# Patient Record
Sex: FEMALE | Race: BLACK OR AFRICAN AMERICAN | NOT HISPANIC OR LATINO | Employment: OTHER | URBAN - METROPOLITAN AREA
[De-identification: names, ages, dates, MRNs, and addresses within clinical notes are randomized per-mention and may not be internally consistent; named-entity substitution may affect disease eponyms.]

---

## 2017-06-27 ENCOUNTER — TRANSCRIBE ORDERS (OUTPATIENT)
Dept: PHYSICAL THERAPY | Facility: HOSPITAL | Age: 52
End: 2017-06-27

## 2017-06-27 DIAGNOSIS — M48.00 SPINAL STENOSIS, UNSPECIFIED SPINAL REGION: Primary | ICD-10-CM

## 2017-07-18 ENCOUNTER — HOSPITAL ENCOUNTER (OUTPATIENT)
Dept: PHYSICAL THERAPY | Facility: HOSPITAL | Age: 52
Setting detail: THERAPIES SERIES
Discharge: HOME | End: 2017-07-18

## 2017-07-18 DIAGNOSIS — M54.42 CHRONIC BILATERAL LOW BACK PAIN WITH BILATERAL SCIATICA: Primary | ICD-10-CM

## 2017-07-18 DIAGNOSIS — G89.29 CHRONIC BILATERAL LOW BACK PAIN WITH BILATERAL SCIATICA: Primary | ICD-10-CM

## 2017-07-18 DIAGNOSIS — M54.41 CHRONIC BILATERAL LOW BACK PAIN WITH BILATERAL SCIATICA: Primary | ICD-10-CM

## 2017-07-18 PROCEDURE — G8979 MOBILITY GOAL STATUS: HCPCS

## 2017-07-18 PROCEDURE — G8978 MOBILITY CURRENT STATUS: HCPCS

## 2017-07-18 PROCEDURE — 97110 THERAPEUTIC EXERCISES: CPT | Performed by: PHYSICAL THERAPIST

## 2017-07-18 PROCEDURE — 97161 PT EVAL LOW COMPLEX 20 MIN: CPT | Performed by: PHYSICAL THERAPIST
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
## 2017-07-19 ENCOUNTER — HOSPITAL ENCOUNTER (OUTPATIENT)
Dept: PHYSICAL THERAPY | Facility: HOSPITAL | Age: 52
Setting detail: THERAPIES SERIES
Discharge: HOME | End: 2017-07-19

## 2017-07-19 DIAGNOSIS — G89.29 CHRONIC BILATERAL LOW BACK PAIN WITH BILATERAL SCIATICA: Primary | ICD-10-CM

## 2017-07-19 DIAGNOSIS — M54.42 CHRONIC BILATERAL LOW BACK PAIN WITH BILATERAL SCIATICA: Primary | ICD-10-CM

## 2017-07-19 DIAGNOSIS — M54.41 CHRONIC BILATERAL LOW BACK PAIN WITH BILATERAL SCIATICA: Primary | ICD-10-CM

## 2017-07-19 PROCEDURE — 97113 AQUATIC THERAPY/EXERCISES: CPT | Performed by: PHYSICAL THERAPIST

## 2017-07-19 NOTE — THERAPY TREATMENT NOTE
Outpatient Physical Therapy Ortho Treatment Note  [facility]     Patient Name: Jessenia López  : 1965  MRN: [id number]  Today's Date: 2017      Visit Date: 2017    Visit Dx:    ICD-10-CM ICD-9-CM   1. Chronic bilateral low back pain with bilateral sciatica M54.42 724.2    M54.41 724.3    G89.29 338.29       There is no problem list on file for this patient.       Past Medical History:   Diagnosis Date   • Arthritis    • Diabetes mellitus    • Hypertension    • Osteoporosis         Past Surgical History:   Procedure Laterality Date   • BRAIN SURGERY      brain tumor removed               PT Assessment/Plan       17 1531       PT Assessment    Assessment Comments Initiated aqua therapy today and provided demonstration for all exercises performed as well as postural cues. Patient unable to maintain stability during bicycle kicks without min A from the therapist and reported a slight increase in right hip pain during the hip circles and hip abduction exercise. Educated patient on engaging abdominals to stabilize spine and performing smaller/more controlled kicks.   -     PT Plan    PT Plan Comments Assess response to first session and progress with core stabilization, LE strengthening, and LE flexibility.   -ROCKY       User Key  (r) = Recorded By, (t) = Taken By, (c) = Cosigned By    Initials Name Provider Type    ROCKY Rangel, PT Physical Therapist                    Exercises       17 1300          Subjective Comments    Subjective Comments I've been having a new pain in my right hip for the past week and a half. I'm thinking I need to go talk to my doctor about it  -ROCKY      Subjective Pain    Able to rate subjective pain? yes  -ROCKY      Pre-Treatment Pain Level 6  -KH      Post-Treatment Pain Level 6  -KH      Aquatics    Aquatics performed? Yes  -ROCKY      Aquatics LE    Water Walk forward;side;backward   x3 each  -ROCKY      Stretch 2 HS with noodle and sweeps, small noodle  -ROCKY       Stretch 3 piriformis 2x 30  -KH      Stretch Other 1 hip circles 10/10   -KH      Stretch Other 2 DKTC holding rail for decompression purposes 30 seconds prn  -KH      Abdominals noodle   large x 15  -KH      Clams --  -KH      Hip Abd/Add 15  -KH      Mini Squat 15  -KH      Toe/Heel Raises water jogging 2 minutes  -KH      Uni-Squat jumping jacks 1 minute  -KH      Bicycle 2 minutes with LN and min A  -KH        User Key  (r) = Recorded By, (t) = Taken By, (c) = Cosigned By    Initials Name Provider Type    ROCKY Rangel, PT Physical Therapist        Time Calculation:   Start Time: 1345  Stop Time: 1428  Time Calculation (min): 43 min    Therapy Charges for Today     Code Description Service Date Service Provider Modifiers Qty    96898086384 HC PT AQUATIC THERAPY EA 15 MIN 7/19/2017 Donna Rangel, PT GP 3                    Donna Rangel, PT  7/19/2017

## 2017-07-31 ENCOUNTER — HOSPITAL ENCOUNTER (OUTPATIENT)
Dept: PHYSICAL THERAPY | Facility: HOSPITAL | Age: 52
Setting detail: THERAPIES SERIES
End: 2017-07-31

## 2017-08-02 ENCOUNTER — APPOINTMENT (OUTPATIENT)
Dept: PHYSICAL THERAPY | Facility: HOSPITAL | Age: 52
End: 2017-08-02

## 2017-08-07 ENCOUNTER — APPOINTMENT (OUTPATIENT)
Dept: PHYSICAL THERAPY | Facility: HOSPITAL | Age: 52
End: 2017-08-07

## 2017-08-09 ENCOUNTER — APPOINTMENT (OUTPATIENT)
Dept: PHYSICAL THERAPY | Facility: HOSPITAL | Age: 52
End: 2017-08-09

## 2017-08-14 ENCOUNTER — HOSPITAL ENCOUNTER (OUTPATIENT)
Dept: PHYSICAL THERAPY | Facility: HOSPITAL | Age: 52
Setting detail: THERAPIES SERIES
Discharge: HOME | End: 2017-08-14

## 2017-08-14 DIAGNOSIS — M54.42 CHRONIC BILATERAL LOW BACK PAIN WITH BILATERAL SCIATICA: Primary | ICD-10-CM

## 2017-08-14 DIAGNOSIS — G89.29 CHRONIC BILATERAL LOW BACK PAIN WITH BILATERAL SCIATICA: Primary | ICD-10-CM

## 2017-08-14 DIAGNOSIS — M54.41 CHRONIC BILATERAL LOW BACK PAIN WITH BILATERAL SCIATICA: Primary | ICD-10-CM

## 2017-08-14 PROCEDURE — 97113 AQUATIC THERAPY/EXERCISES: CPT | Performed by: PHYSICAL THERAPIST

## 2017-08-14 NOTE — THERAPY TREATMENT NOTE
6   Outpatient Physical Therapy Ortho Treatment Note  [facility]     Patient Name: Jessenia López  : 1965  MRN: [id number]  Today's Date: 2017      Visit Date: 2017    Visit Dx:    ICD-10-CM ICD-9-CM   1. Chronic bilateral low back pain with bilateral sciatica M54.42 724.2    M54.41 724.3    G89.29 338.29       There is no problem list on file for this patient.       Past Medical History:   Diagnosis Date   • Arthritis    • Diabetes mellitus    • Hypertension    • Osteoporosis         Past Surgical History:   Procedure Laterality Date   • BRAIN SURGERY      brain tumor removed                             PT Assessment/Plan       17 1203       PT Assessment    Assessment Comments Patient reports high pain level today, therefore began session seated and performed bicycle kicks seated instead of using noodle. Also added seated lumbar stretch to try and alleviate pain and ended session in hot tub.   -     PT Plan    PT Plan Comments Progress with core stability, LE strengthening, and LE flexibility as tolerated.   -ROCKY       User Key  (r) = Recorded By, (t) = Taken By, (c) = Cosigned By    Initials Name Provider Type    ROCKY Rangel, PT Physical Therapist                    Exercises       17 1200          Subjective Comments    Subjective Comments Ever since I woke up my back pain has been hurting. It's really bad today.   -ROCKY      Subjective Pain    Able to rate subjective pain? yes  -      Pre-Treatment Pain Level 9  -KH      Post-Treatment Pain Level 9  -KH      Aquatics    Aquatics performed? Yes  -      Aquatics LE    Water Walk forward;side;backward   x3 each  -KH      Stretch 1 sit to stand x10 no hands  -KH      Stretch 2 HS with noodle and sweeps, small noodle  -KH      Stretch 3 piriformis 2x 30  -KH      Stretch Other 1 hip circles 15/15  -KH      Stretch Other 2 DKTC holding rail for decompression purposes 30 seconds prn  -KH      Abdominals noodle   large x 15   -KH      Hip Abd/Add 15  -KH      Toe/Heel Raises water jogging 2 minutes  -KH      Uni-Squat jumping jacks 1 minute  -KH      Bicycle 2 minutes on bench  -KH      Flutter/Scissor 2 min each  -      Aquatics UE    Stretch 1 lumbar forward trunk flexion at bench with LN and then trunk rotation ~1 min   -        User Key  (r) = Recorded By, (t) = Taken By, (c) = Cosigned By    Initials Name Provider Type    ROCKY Rangel, PT Physical Therapist                               PT OP Goals       08/14/17 1200       PT Short Term Goals    STG Date to Achieve 03/18/16  -     STG 1 Patient will report a reduction in pain for 24 hours or greater following aquatic therapy session  -     STG 1 Progress Met  -     STG 1 Progress Comments pain noticeably better for about a week after 1st session.  -     STG 2 Patient will improve 5xSTS from 22.2 seconds to <18 seconds in order to decrease risk of falls  -     STG 2 Progress Ongoing  -     STG 3 Patient will demonstrate safety and independence with initial HEP  -     STG 3 Progress Met  -     Long Term Goals    LTG Date to Achieve 04/08/16  -     LTG 1 Patient will report increased standing tolerance from 5 minutes to 10 min or greater to allow patient to complete ADLs without an increase in pain  -     LTG 1 Progress Ongoing  -     LTG 1 Progress Comments was better but is worse today  -     LTG 2 Patient will improve perceived level of disability as measured by the Oswestry from 54% disability to </=40% disability in order to improve quality of life.   -     LTG 2 Progress Ongoing  -     LTG 3 Patient will be educated on and demonstrate safety and independence with advanced aquatic exercises for core stabilization  -     LTG 3 Progress Ongoing  -     LTG 4 Pt will be independent with advanced aquatic program for decreased pain and improved mobility with ADLs  -     LTG 4 Progress Ongoing  -       User Key  (r) = Recorded By, (t) = Taken  By, (c) = Cosigned By    Initials Name Provider Type     Donna Rangel, PT Physical Therapist                    Time Calculation:   Start Time: 1159  Stop Time: 1242  Time Calculation (min): 43 min    Therapy Charges for Today     Code Description Service Date Service Provider Modifiers Qty    46616181030  PT AQUATIC THERAPY EA 15 MIN 8/14/2017 Donna Rangel, PT GP 3                    Donna Rangel, PT  8/14/2017

## 2017-08-16 ENCOUNTER — HOSPITAL ENCOUNTER (OUTPATIENT)
Dept: PHYSICAL THERAPY | Facility: HOSPITAL | Age: 52
Setting detail: THERAPIES SERIES
Discharge: HOME | End: 2017-08-16

## 2017-08-16 DIAGNOSIS — G89.29 CHRONIC BILATERAL LOW BACK PAIN WITH BILATERAL SCIATICA: Primary | ICD-10-CM

## 2017-08-16 DIAGNOSIS — M54.42 CHRONIC BILATERAL LOW BACK PAIN WITH BILATERAL SCIATICA: Primary | ICD-10-CM

## 2017-08-16 DIAGNOSIS — M54.41 CHRONIC BILATERAL LOW BACK PAIN WITH BILATERAL SCIATICA: Primary | ICD-10-CM

## 2017-08-16 PROCEDURE — G8978 MOBILITY CURRENT STATUS: HCPCS | Performed by: PHYSICAL THERAPIST

## 2017-08-16 PROCEDURE — G8979 MOBILITY GOAL STATUS: HCPCS | Performed by: PHYSICAL THERAPIST

## 2017-08-16 PROCEDURE — 97113 AQUATIC THERAPY/EXERCISES: CPT | Performed by: PHYSICAL THERAPIST

## 2017-08-16 NOTE — THERAPY PROGRESS REPORT/RE-CERT
Outpatient Physical Therapy Ortho Treatment Note  [facility]     Patient Name: Jessenia López  : 1965  MRN: [id number]  Today's Date: 2017      Visit Date: 2017    Visit Dx:    ICD-10-CM ICD-9-CM   1. Chronic bilateral low back pain with bilateral sciatica M54.42 724.2    M54.41 724.3    G89.29 338.29       There is no problem list on file for this patient.       Past Medical History:   Diagnosis Date   • Arthritis    • Diabetes mellitus    • Hypertension    • Osteoporosis         Past Surgical History:   Procedure Laterality Date   • BRAIN SURGERY      brain tumor removed             PT Ortho       17 1100    Myotomal Screen- Lower Quarter Clearing    Hip flexion (L2) Bilateral:;4- (Good -)  -KH    Knee extension (L3) Left:;4- (Good -);Right:;4 (Good)  -KH    Ankle DF (L4) Left:;4- (Good -);Right:;4 (Good)  -KH    Ankle PF (S1) Left:;4- (Good -);Right:;4 (Good)  -KH    Knee flexion (S2) Bilateral:;4 (Good)  -KH      User Key  (r) = Recorded By, (t) = Taken By, (c) = Cosigned By    Initials Name Provider Type    ROCKY Rangel PT Physical Therapist                            PT Assessment/Plan       17 1155       PT Assessment    Functional Limitations Performance in leisure activities;Limitation in home management;Limitations in community activities;Impaired gait;Limitations in functional capacity and performance  -     Impairments Pain;Muscle strength;Posture;Range of motion;Balance;Endurance;Gait;Joint integrity;Poor body mechanics;Joint mobility  -     Assessment Comments Patient has now received 3 sessions of skilled PT for back pain due to spinal stenosis. She reports minimal improvement in her standing tolerance however demonstrates a slight improvement in her left knee flexion strength since eval. She also improved her 5xSTS from 22.4 seconds at eval to 13.4 seconds today demonstrating increased functional strength. No significant/consistent changes in pain but  only limited number of sessions completed. Recommend continued skilled PT to further increase functional strength and core stability.    -     Please refer to paper survey for additional self-reported information Yes  -KH     Rehab Potential Good  -KH     Patient/caregiver participated in establishment of treatment plan and goals Yes  -KH     Patient would benefit from skilled therapy intervention Yes  -KH     PT Plan    PT Frequency 2x/week  -KH     Predicted Duration of Therapy Intervention (days/wks) 4 weeks   -KH     Planned CPT's? PT AQUATIC THERAPY EA 15 MIN: 53707;PT RE-EVAL: 03160;PT THER PROC EA 15 MIN: 91636  -KH     Physical Therapy Interventions (Optional Details) aquatics exercise;home exercise program;stretching;transfer training;postural re-education;ROM (Range of Motion);stair training;strengthening;gait training;gross motor skills;patient/family education;lumbar stabilization  -     PT Plan Comments Progress with core stability, LE strengthening, and LE flexibility as tolerated.   -       User Key  (r) = Recorded By, (t) = Taken By, (c) = Cosigned By    Initials Name Provider Type    ROCKY Rangel, PT Physical Therapist                    Exercises       08/16/17 1100          Subjective Comments    Subjective Comments My pain is okay today but I took an Ibprofen before I came   -      Subjective Pain    Able to rate subjective pain? yes  -KH      Pre-Treatment Pain Level 5  -KH      Post-Treatment Pain Level 5  -KH      Aquatics    Aquatics performed? Yes  -KH      Aquatics LE    Water Walk forward;side;backward   x3 each  -KH      Stretch 1 sit to stand x10 no hands  -KH      Stretch 2 HS with noodle and sweeps, small noodle  -KH      Stretch 3 piriformis 2x 30  -KH      Stretch Other 1 hip circles 15/15  -KH      Stretch Other 2 DKTC holding rail for decompression purposes 30 seconds prn  -KH      Abdominals noodle   large x15  -KH      Hip Abd/Add 15x  -KH      Hip Flex/Ext 15x ext  -KH  "     March in Place 30x  -KH      Mini Squat 15  -KH      Toe/Heel Raises water jogging 2 minutes  -KH      Uni-Squat jumping jacks 1 minute  -KH      Uni-Clock leg press blue ring x 20  -KH      Step Ups 8\" x 10  -KH      Bicycle 2 minutes on bench and 2 min with LN  -KH      Flutter/Scissor 2 min each  -        User Key  (r) = Recorded By, (t) = Taken By, (c) = Cosigned By    Initials Name Provider Type    ROCKY Rangel PT Physical Therapist                               PT OP Goals       08/16/17 1200       PT Short Term Goals    STG Date to Achieve 03/18/16  -     STG 1 Patient will report a reduction in pain for 24 hours or greater following aquatic therapy session  -     STG 1 Progress Met  -     STG 2 Patient will improve 5xSTS from 22.2 seconds to <18 seconds in order to decrease risk of falls  -     STG 2 Progress Met  -     STG 2 Progress Comments 13.4 sec  -     STG 3 Patient will demonstrate safety and independence with initial HEP  -     STG 3 Progress Met  -     Long Term Goals    LTG Date to Achieve 04/08/16  -     LTG 1 Patient will report increased standing tolerance from 5 minutes to 10 min or greater to allow patient to complete ADLs without an increase in pain  -     LTG 1 Progress Ongoing  -     LTG 1 Progress Comments still the same   -     LTG 2 Patient will improve perceived level of disability as measured by the Oswestry from 54% disability to </=40% disability in order to improve quality of life.   -     LTG 2 Progress Ongoing  -     LTG 2 Progress Comments 66% disability  -     LTG 3 Patient will be educated on and demonstrate safety and independence with advanced aquatic exercises for core stabilization  -     LTG 3 Progress Ongoing  -       User Key  (r) = Recorded By, (t) = Taken By, (c) = Cosigned By    Initials Name Provider Type    ROCKY Rangel PT Physical Therapist                    Outcome Measures       08/16/17 1100          5 Times Sit " to Stand    5 Times Sit to Stand (seconds) 13.4 seconds  -      5 Times Sit to Stand Comments no hands  -      Modified Oswestry    Modified Oswestry Score/Comments 66% disability  -      Functional Assessment    Outcome Measure Options 5x Sit to Stand;Modifed Owestry  -KH        User Key  (r) = Recorded By, (t) = Taken By, (c) = Cosigned By    Initials Name Provider Type    ROCKY Rangel PT Physical Therapist            Time Calculation:   Start Time: 1158  Stop Time: 1241  Time Calculation (min): 43 min    Therapy Charges for Today     Code Description Service Date Service Provider Modifiers Qty    62732759757 HC PT AQUATIC THERAPY EA 15 MIN 8/16/2017 Donna Rangel, PT GP 3    19903940858 HC PT MOBILITY CURRENT 8/16/2017 Donna Rangel, PT GP, CL 1    [id number] HC PT MOBILITY PROJECTED 8/16/2017 Donna Rangel, PT GP, CK 1          PT G-Codes  PT Professional Judgement Used?: Yes  Outcome Measure Options: Moddemarcusarjun Huertalexus  Score: 66%  Functional Limitation: Mobility: Walking and moving around  Mobility: Walking and Moving Around Current Status (): At least 60 percent but less than 80 percent impaired, limited or restricted  Mobility: Walking and Moving Around Goal Status (): At least 40 percent but less than 60 percent impaired, limited or restricted         Donna Rangel, BLANCA  8/16/2017

## 2017-08-21 ENCOUNTER — HOSPITAL ENCOUNTER (OUTPATIENT)
Dept: PHYSICAL THERAPY | Facility: HOSPITAL | Age: 52
Setting detail: THERAPIES SERIES
Discharge: HOME | End: 2017-08-21

## 2017-08-21 DIAGNOSIS — M54.42 CHRONIC BILATERAL LOW BACK PAIN WITH BILATERAL SCIATICA: Primary | ICD-10-CM

## 2017-08-21 DIAGNOSIS — G89.29 CHRONIC BILATERAL LOW BACK PAIN WITH BILATERAL SCIATICA: Primary | ICD-10-CM

## 2017-08-21 DIAGNOSIS — M54.41 CHRONIC BILATERAL LOW BACK PAIN WITH BILATERAL SCIATICA: Primary | ICD-10-CM

## 2017-08-21 PROCEDURE — 97113 AQUATIC THERAPY/EXERCISES: CPT | Performed by: PHYSICAL THERAPIST

## 2017-08-21 NOTE — THERAPY TREATMENT NOTE
Outpatient Physical Therapy Ortho Treatment Note  [facility]     Patient Name: Jessenia López  : 1965  MRN: [id number]  Today's Date: 2017      Visit Date: 2017    Visit Dx:    ICD-10-CM ICD-9-CM   1. Chronic bilateral low back pain with bilateral sciatica M54.42 724.2    M54.41 724.3    G89.29 338.29       There is no problem list on file for this patient.       Past Medical History:   Diagnosis Date   • Arthritis    • Diabetes mellitus    • Hypertension    • Osteoporosis         Past Surgical History:   Procedure Laterality Date   • BRAIN SURGERY      brain tumor removed                             PT Assessment/Plan       17 1229       PT Assessment    Assessment Comments Patient's pain level appears to be steadily improving and she was able to complete her water walks and stretches with minimal cueing today. Patient still requires min A to stabilize her balance when performing bicycle kicks with noodle as well as UE support on railing   -KH     PT Plan    PT Plan Comments Progress with core stability, LE strengthening, and LE flexibility as tolerated.   -ROCKY       User Key  (r) = Recorded By, (t) = Taken By, (c) = Cosigned By    Initials Name Provider Type    ROCKY Rangel, PT Physical Therapist                    Exercises       17 1200          Subjective Comments    Subjective Comments I feel a whole lot better than I did when I first started therapy  -KH      Subjective Pain    Able to rate subjective pain? yes  -KH      Pre-Treatment Pain Level 4  -KH      Post-Treatment Pain Level 4  -KH      Aquatics    Aquatics performed? Yes  -ROCKY      Aquatics LE    Water Walk forward;side;backward   x3  -KH      Stretch 1 sit to stand x10 no hands  -KH      Stretch 2 HS with noodle and sweeps, small noodle  -KH      Stretch 3 piriformis 2x 30  -KH      Stretch Other 1 hip circles 20/20  -KH      Stretch Other 2 DKTC holding rail for decompression purposes 30 seconds prn  -KH    "   Abdominals noodle   large x 15  -KH      Hip Abd/Add 15x  -KH      Hip Flex/Ext 15x ext  -KH      March in Place 30x  -KH      Mini Squat 15  -KH      Toe/Heel Raises water jogging 2 minutes  -KH      Uni-Squat jumping jacks 1 minute  -KH      Uni-Clock leg press blue ring x 20  -KH      Step Ups 8\" x 10  -KH      Bicycle 2 minutes on bench and 2 min with LN  -KH      Flutter/Scissor 2 min each  -        User Key  (r) = Recorded By, (t) = Taken By, (c) = Cosigned By    Initials Name Provider Type    ROCKY Rangel, PT Physical Therapist                               PT OP Goals       08/21/17 1200       PT Short Term Goals    STG Date to Achieve 03/18/16  -     STG 1 Patient will report a reduction in pain for 24 hours or greater following aquatic therapy session  -     STG 1 Progress Met  -     STG 2 Patient will improve 5xSTS from 22.2 seconds to <18 seconds in order to decrease risk of falls  -     STG 2 Progress Met  -     STG 3 Patient will demonstrate safety and independence with initial HEP  -     STG 3 Progress Met  -     Long Term Goals    LTG Date to Achieve 04/08/16  -     LTG 1 Patient will report increased standing tolerance from 5 minutes to 10 min or greater to allow patient to complete ADLs without an increase in pain  -     LTG 1 Progress Ongoing  -     LTG 1 Progress Comments no change   -     LTG 2 Patient will improve perceived level of disability as measured by the Oswestry from 54% disability to </=40% disability in order to improve quality of life.   -     LTG 2 Progress Ongoing  -     LTG 3 Patient will be educated on and demonstrate safety and independence with advanced aquatic exercises for core stabilization  -     LTG 3 Progress Ongoing  -     LTG 4 Pt will be independent with advanced aquatic program for decreased pain and improved mobility with ADLs  -     LTG 4 Progress Ongoing  -       User Key  (r) = Recorded By, (t) = Taken By, (c) = Cosigned " By    Initials Name Provider Type     Donna Rangel, PT Physical Therapist                    Time Calculation:   Start Time: 1200  Stop Time: 1242  Time Calculation (min): 42 min    Therapy Charges for Today     Code Description Service Date Service Provider Modifiers Qty    06429424705  PT AQUATIC THERAPY EA 15 MIN 8/21/2017 Donna Rangel, PT GP 3                    Donna Rangel, BLANCA  8/21/2017

## 2017-08-23 ENCOUNTER — HOSPITAL ENCOUNTER (OUTPATIENT)
Dept: PHYSICAL THERAPY | Facility: HOSPITAL | Age: 52
Setting detail: THERAPIES SERIES
Discharge: HOME | End: 2017-08-23

## 2017-08-23 DIAGNOSIS — M54.42 CHRONIC BILATERAL LOW BACK PAIN WITH BILATERAL SCIATICA: Primary | ICD-10-CM

## 2017-08-23 DIAGNOSIS — G89.29 CHRONIC BILATERAL LOW BACK PAIN WITH BILATERAL SCIATICA: Primary | ICD-10-CM

## 2017-08-23 DIAGNOSIS — M54.41 CHRONIC BILATERAL LOW BACK PAIN WITH BILATERAL SCIATICA: Primary | ICD-10-CM

## 2017-08-23 PROCEDURE — 97113 AQUATIC THERAPY/EXERCISES: CPT | Performed by: PHYSICAL THERAPIST

## 2017-08-23 NOTE — THERAPY TREATMENT NOTE
Outpatient Physical Therapy Ortho Treatment Note  [facility]     Patient Name: Jessenia López  : 1965  MRN: [id number]  Today's Date: 2017      Visit Date: 2017    Visit Dx:    ICD-10-CM ICD-9-CM   1. Chronic bilateral low back pain with bilateral sciatica M54.42 724.2    M54.41 724.3    G89.29 338.29       There is no problem list on file for this patient.       Past Medical History:   Diagnosis Date   • Arthritis    • Diabetes mellitus    • Hypertension    • Osteoporosis         Past Surgical History:   Procedure Laterality Date   • BRAIN SURGERY      brain tumor removed                             PT Assessment/Plan       17 1352       PT Assessment    Assessment Comments Patient able to progress to large noodle for hamstring stretches today. Also progressed to two sets of step ups which seem to be helping functionally since patient reports less difficulty stairclimbing at home . Provided vc's to engage abdominals during hip extension exercise in order to avoid forward trunk lean.,  -KH     PT Plan    PT Plan Comments Progress with core stability, LE strengthening, and LE flexibility as tolerated  -ROCKY       User Key  (r) = Recorded By, (t) = Taken By, (c) = Cosigned By    Initials Name Provider Type    ORCKY Rangel, PT Physical Therapist                    Exercises       17 1300          Subjective Comments    Subjective Comments we're moving apartments so I'm sore from lifting yesterday  -ROCKY      Subjective Pain    Able to rate subjective pain? yes  -ROCKY      Pre-Treatment Pain Level 5  -KH      Post-Treatment Pain Level 5  -KH      Aquatics    Aquatics performed? Yes  -ROCKY      Aquatics LE    Water Walk forward;side;backward   x3 each  -KH      Stretch 2 HS with noodle and sweeps x 15, large noodle  -KH      Stretch 3 piriformis 2x 30  -KH      Stretch Other 1 hip circles 20/20  -KH      Stretch Other 2 DKTC holding rail for decompression purposes 30 seconds prn  -KH    "   Abdominals noodle   large x 15  -KH      Hip Abd/Add 15x  -KH      Hip Flex/Ext 15x ext  -KH      March in Place 30x  -KH      Mini Squat 15  -KH      Toe/Heel Raises water jogging 2 minutes  -KH      Uni-Squat jumping jacks 1 minute  -KH      Uni-Clock leg press blue ring x 20  -KH      Step Ups 8\" - 2 x 10  -KH      Bicycle 2 minutes on bench and 2 min with LN  -KH      Flutter/Scissor 2 min each  -        User Key  (r) = Recorded By, (t) = Taken By, (c) = Cosigned By    Initials Name Provider Type    ROCKY Rangel, PT Physical Therapist                                       Time Calculation:   Start Time: 1340  Stop Time: 1425  Time Calculation (min): 45 min    Therapy Charges for Today     Code Description Service Date Service Provider Modifiers Qty    [id number]  PT AQUATIC THERAPY EA 15 MIN 8/23/2017 Donna Rangel, PT GP 3                    Donna Rangel, PT  8/23/2017       "

## 2017-08-28 ENCOUNTER — HOSPITAL ENCOUNTER (OUTPATIENT)
Dept: PHYSICAL THERAPY | Facility: HOSPITAL | Age: 52
Setting detail: THERAPIES SERIES
Discharge: HOME | End: 2017-08-28

## 2017-08-28 DIAGNOSIS — M54.42 CHRONIC BILATERAL LOW BACK PAIN WITH BILATERAL SCIATICA: Primary | ICD-10-CM

## 2017-08-28 DIAGNOSIS — G89.29 CHRONIC BILATERAL LOW BACK PAIN WITH BILATERAL SCIATICA: Primary | ICD-10-CM

## 2017-08-28 DIAGNOSIS — M54.41 CHRONIC BILATERAL LOW BACK PAIN WITH BILATERAL SCIATICA: Primary | ICD-10-CM

## 2017-08-28 PROCEDURE — 97113 AQUATIC THERAPY/EXERCISES: CPT | Performed by: PHYSICAL THERAPIST

## 2017-08-28 NOTE — THERAPY TREATMENT NOTE
Outpatient Physical Therapy Ortho Treatment Note  [facility]     Patient Name: Jessenia López  : 1965  MRN: [id number]  Today's Date: 2017      Visit Date: 2017    Visit Dx:    ICD-10-CM ICD-9-CM   1. Chronic bilateral low back pain with bilateral sciatica M54.42 724.2    M54.41 724.3    G89.29 338.29       There is no problem list on file for this patient.       Past Medical History:   Diagnosis Date   • Arthritis    • Diabetes mellitus    • Hypertension    • Osteoporosis         Past Surgical History:   Procedure Laterality Date   • BRAIN SURGERY      brain tumor removed                             PT Assessment/Plan       17 1232       PT Assessment    Assessment Comments Patient becoming more aware of posture with aquatic routine and demonstrating more independence, however continues to struggle maintaining stability during bicycle kicks due to poor core strength.   -     PT Plan    PT Plan Comments Progress with core stability, LE strengthening, and LE flexibility as tolerated.   -       User Key  (r) = Recorded By, (t) = Taken By, (c) = Cosigned By    Initials Name Provider Type    ROCKY Rangel, PT Physical Therapist                    Exercises       17 1200          Subjective Comments    Subjective Comments I'm hurting a little today  -      Subjective Pain    Able to rate subjective pain? yes  -KH      Pre-Treatment Pain Level 5  -KH      Post-Treatment Pain Level 5  -KH      Aquatics    Aquatics performed? Yes  -KH      Aquatics LE    Water Walk forward;side;backward   x3 each  -KH      Stretch 2 HS with noodle and sweeps x 15, large noodle  -KH      Stretch 3 piriformis 2x 30  -KH      Stretch Other 1 hip circles 20/20  -KH      Stretch Other 2 DKTC holding rail for decompression purposes 30 seconds prn  -KH      Abdominals noodle   large x 15  -KH      Hip Abd/Add 15x  -KH      Hip Flex/Ext 15x ext  -KH      March in Place 30x  -KH      Mini Squat 15  -KH  "     Toe/Heel Raises water jogging 2 minutes  -KH      Uni-Squat jumping jacks 1 minute  -KH      Uni-Clock leg press blue ring x 20  -KH      Step Ups 8\" - 2 x 10  -KH      Bicycle 2 minutes on bench and 2 min with LN  -      Flutter/Scissor 2 min each  -        User Key  (r) = Recorded By, (t) = Taken By, (c) = Cosigned By    Initials Name Provider Type    ROCKY Rangel PT Physical Therapist                               PT OP Goals       08/28/17 1200       PT Short Term Goals    STG Date to Achieve 03/18/16  -KH     STG 1 Patient will report a reduction in pain for 24 hours or greater following aquatic therapy session  -     STG 1 Progress Met  -     STG 2 Patient will improve 5xSTS from 22.2 seconds to <18 seconds in order to decrease risk of falls  -     STG 2 Progress Met  -     STG 3 Patient will demonstrate safety and independence with initial HEP  -     STG 3 Progress Met  -     Long Term Goals    LTG Date to Achieve 04/08/16  -     LTG 1 Patient will report increased standing tolerance from 5 minutes to 10 min or greater to allow patient to complete ADLs without an increase in pain  -     LTG 1 Progress Ongoing  -     LTG 2 Patient will improve perceived level of disability as measured by the Oswestry from 54% disability to </=40% disability in order to improve quality of life.   -     LTG 2 Progress Ongoing  -     LTG 3 Patient will be educated on and demonstrate safety and independence with advanced aquatic exercises for core stabilization  -     LTG 3 Progress Ongoing  -     LTG 4 Pt will be independent with advanced aquatic program for decreased pain and improved mobility with ADLs  -     LTG 4 Progress Ongoing  -       User Key  (r) = Recorded By, (t) = Taken By, (c) = Cosigned By    Initials Name Provider Type    ROCKY Rangel PT Physical Therapist                    Time Calculation:   Start Time: 1200  Stop Time: 1242  Time Calculation (min): 42 min    Therapy " Charges for Today     Code Description Service Date Service Provider Modifiers Qty    68816898953 HC PT AQUATIC THERAPY EA 15 MIN 8/28/2017 Donna Rangel, PT GP 3                    Donna Rangel, PT  8/28/2017

## 2017-08-30 ENCOUNTER — APPOINTMENT (OUTPATIENT)
Dept: PHYSICAL THERAPY | Facility: HOSPITAL | Age: 52
End: 2017-08-30

## 2017-09-18 ENCOUNTER — HOSPITAL ENCOUNTER (OUTPATIENT)
Dept: PHYSICAL THERAPY | Facility: HOSPITAL | Age: 52
Setting detail: THERAPIES SERIES
Discharge: HOME | End: 2017-09-18

## 2017-09-18 DIAGNOSIS — G89.29 CHRONIC BILATERAL LOW BACK PAIN WITH BILATERAL SCIATICA: Primary | ICD-10-CM

## 2017-09-18 DIAGNOSIS — M54.41 CHRONIC BILATERAL LOW BACK PAIN WITH BILATERAL SCIATICA: Primary | ICD-10-CM

## 2017-09-18 DIAGNOSIS — M54.42 CHRONIC BILATERAL LOW BACK PAIN WITH BILATERAL SCIATICA: Primary | ICD-10-CM

## 2017-09-18 PROCEDURE — G8979 MOBILITY GOAL STATUS: HCPCS | Performed by: PHYSICAL THERAPIST

## 2017-09-18 PROCEDURE — 97113 AQUATIC THERAPY/EXERCISES: CPT | Performed by: PHYSICAL THERAPIST

## 2017-09-18 PROCEDURE — G8978 MOBILITY CURRENT STATUS: HCPCS | Performed by: PHYSICAL THERAPIST

## 2017-09-18 NOTE — THERAPY PROGRESS REPORT/RE-CERT
Outpatient Physical Therapy Ortho Progress Note  [facility]     Patient Name: Jessenia López  : 1965  MRN: [id number]  Today's Date: 2017      Visit Date: 2017    Visit Dx:    ICD-10-CM ICD-9-CM   1. Chronic bilateral low back pain with bilateral sciatica M54.42 724.2    M54.41 724.3    G89.29 338.29       There is no problem list on file for this patient.       Past Medical History:   Diagnosis Date   • Arthritis    • Diabetes mellitus    • Hypertension    • Osteoporosis         Past Surgical History:   Procedure Laterality Date   • BRAIN SURGERY      brain tumor removed             PT Ortho       17 1400    Subjective Comments    Subjective Comments I'm hurting today  -KH    Subjective Pain    Able to rate subjective pain? yes  -KH    Pre-Treatment Pain Level 8  -KH    Post-Treatment Pain Level 7  -KH    Myotomal Screen- Lower Quarter Clearing    Hip flexion (L2) Bilateral:;4- (Good -)  -KH    Knee extension (L3) Bilateral:;4- (Good -)  -KH    Ankle DF (L4) Bilateral:;4- (Good -)  -KH    Ankle PF (S1) Bilateral:;4 (Good)  -KH    Knee flexion (S2) Bilateral:;4 (Good)  -KH      User Key  (r) = Recorded By, (t) = Taken By, (c) = Cosigned By    Initials Name Provider Type    ROCKY Rangel PT Physical Therapist                            PT Assessment/Plan       17 1443       PT Assessment    Functional Limitations Decreased safety during functional activities;Limitations in community activities;Impaired gait;Limitations in functional capacity and performance;Limitation in home management;Performance in self-care ADL;Performance in leisure activities  -     Impairments Pain;Muscle strength;Posture;Range of motion;Balance;Endurance;Gait;Joint integrity;Poor body mechanics;Joint mobility  -     Assessment Comments Patient has received 7 sessions of skilled PT for LBP due to spinal stenosis. She had a two weeks gap in her care due to cancels, which could be part of the reason  for her higher pain level today. Slight decrease in functional strength on the 5xSTS test and with MMT today. She reports slightly higher perceived disability on the oswestry and no improvement in her standing tolerance overall. She does report temporarily feeling better after therapy, and would make more long term progress from doing aquatic therapy reguarly. Recommend a few more visits of skilled PT to progress patient onto HEP.   -KH     Please refer to paper survey for additional self-reported information Yes  -KH     Rehab Potential Good  -KH     Patient/caregiver participated in establishment of treatment plan and goals Yes  -KH     Patient would benefit from skilled therapy intervention Yes  -KH     PT Plan    PT Frequency 2x/week  -KH     Predicted Duration of Therapy Intervention (days/wks) 4 weeks   -KH     Planned CPT's? PT AQUATIC THERAPY EA 15 MIN: 72212;PT RE-EVAL: 15877  -     Physical Therapy Interventions (Optional Details) aquatics exercise  -     PT Plan Comments Progress core stability, LE strengthening, and LE flexibility as tolerated.   -       User Key  (r) = Recorded By, (t) = Taken By, (c) = Cosigned By    Initials Name Provider Type    ROCKY Rangel, PT Physical Therapist                    Exercises       09/18/17 1400          Subjective Comments    Subjective Comments I'm hurting today  -      Subjective Pain    Able to rate subjective pain? yes  -ROCKY      Pre-Treatment Pain Level 8  -KH      Post-Treatment Pain Level 7  -KH      Aquatics    Aquatics performed? Yes  -KH      Aquatics LE    Water Walk forward;side;backward   x3 each  -KH      Stretch 1 sit to stand x10 no hands  -KH      Stretch 2 HS with noodle and sweeps x 15, large noodle  -KH      Stretch 3 piriformis 2x 30  -KH      Stretch Other 1 hip circles 20/20  -KH      Stretch Other 2 DKTC holding rail for decompression purposes 30 seconds prn  -KH      Abdominals noodle   large x 15  -KH      Hip Abd/Add 15x  -KH       March in Place 30x  -KH      Uni-Clock leg press blue ring x 20  -KH      Bicycle 2 minutes on bench and 2 min with LN  -KH      Flutter/Scissor 2 min each  -        User Key  (r) = Recorded By, (t) = Taken By, (c) = Cosigned By    Initials Name Provider Type    ROCKY Rangel PT Physical Therapist                               PT OP Goals       09/18/17 1400       PT Short Term Goals    STG Date to Achieve 03/18/16  -     STG 1 Patient will report a reduction in pain for 24 hours or greater following aquatic therapy session  -     STG 1 Progress Met  -     STG 2 Patient will improve 5xSTS from 22.2 seconds to <18 seconds in order to decrease risk of falls  -     STG 2 Progress Ongoing  -     STG 2 Progress Comments 26.1  -     STG 3 Patient will demonstrate safety and independence with initial HEP  -     STG 3 Progress Met  -     Long Term Goals    LTG Date to Achieve 04/08/16  -     LTG 1 Patient will report increased standing tolerance from 5 minutes to 10 min or greater to allow patient to complete ADLs without an increase in pain  -     LTG 1 Progress Ongoing  -     LTG 1 Progress Comments no change  -     LTG 2 Patient will improve perceived level of disability as measured by the Oswestry from 54% disability to </=40% disability in order to improve quality of life.   -     LTG 2 Progress Ongoing  -     LTG 2 Progress Comments 70% disability  -     LTG 3 Patient will be educated on and demonstrate safety and independence with advanced aquatic exercises for core stabilization  -     LTG 3 Progress Ongoing  -     LTG 4 Pt will be independent with advanced aquatic program for decreased pain and improved mobility with ADLs  -     LTG 4 Progress Ongoing  -       User Key  (r) = Recorded By, (t) = Taken By, (c) = Cosigned By    Initials Name Provider Type    ROCKY Rangel PT Physical Therapist                    Outcome Measures       09/18/17 1400          5 Times Sit to  Stand    5 Times Sit to Stand (seconds) 26.1 seconds  -      5 Times Sit to Stand Comments no hands  -      Modified Oswestry    Modified Oswestry Score/Comments 70% disability  -      Functional Assessment    Outcome Measure Options Kellyd Bradleyestry  -ROCKY        User Key  (r) = Recorded By, (t) = Taken By, (c) = Cosigned By    Initials Name Provider Type    ROCKY Rangel, PT Physical Therapist            Time Calculation:   Start Time: 1430  Stop Time: 1512  Time Calculation (min): 42 min    Therapy Charges for Today     Code Description Service Date Service Provider Modifiers Qty    79479159246 HC PT MOBILITY CURRENT 9/18/2017 Donna Rangel, PT GP, CL 1    29635964921 HC PT MOBILITY PROJECTED 9/18/2017 Donna Rangel, PT GP, CK 1    20193080679 HC PT AQUATIC THERAPY EA 15 MIN 9/18/2017 Donna Rangel, PT GP 3          PT G-Codes  PT Professional Judgement Used?: Yes  Outcome Measure Options: Chris Giles  Score: 70% disability  Functional Limitation: Mobility: Walking and moving around  Mobility: Walking and Moving Around Current Status (): At least 60 percent but less than 80 percent impaired, limited or restricted  Mobility: Walking and Moving Around Goal Status (): At least 40 percent but less than 60 percent impaired, limited or restricted         Donna Rangel PT  9/18/2017

## 2017-10-04 ENCOUNTER — HOSPITAL ENCOUNTER (OUTPATIENT)
Dept: PHYSICAL THERAPY | Facility: HOSPITAL | Age: 52
Setting detail: THERAPIES SERIES
Discharge: HOME | End: 2017-10-04

## 2017-10-04 DIAGNOSIS — M54.42 CHRONIC BILATERAL LOW BACK PAIN WITH BILATERAL SCIATICA: Primary | ICD-10-CM

## 2017-10-04 DIAGNOSIS — M54.41 CHRONIC BILATERAL LOW BACK PAIN WITH BILATERAL SCIATICA: Primary | ICD-10-CM

## 2017-10-04 DIAGNOSIS — G89.29 CHRONIC BILATERAL LOW BACK PAIN WITH BILATERAL SCIATICA: Primary | ICD-10-CM

## 2017-10-04 PROCEDURE — 97113 AQUATIC THERAPY/EXERCISES: CPT | Performed by: PHYSICAL THERAPIST

## 2017-10-04 NOTE — THERAPY TREATMENT NOTE
Outpatient Physical Therapy Ortho Treatment Note  [facility]     Patient Name: Jessenia López  : 1965  MRN: [id number]  Today's Date: 10/4/2017      Visit Date: 10/04/2017    Visit Dx:    ICD-10-CM ICD-9-CM   1. Chronic bilateral low back pain with bilateral sciatica M54.42 724.2    M54.41 724.3    G89.29 338.29       There is no problem list on file for this patient.       Past Medical History:   Diagnosis Date   • Arthritis    • Diabetes mellitus    • Hypertension    • Osteoporosis         Past Surgical History:   Procedure Laterality Date   • BRAIN SURGERY      brain tumor removed                             PT Assessment/Plan       10/04/17 0948       PT Assessment    Assessment Comments Patient reports tripping on rug this past Monday morning and falling into table with ledt shoulder/chest area. She did not seek medical attention and reports feeling better in the affected area.  She was given information on area pools available for membership to assist with management of her condition.   -NADJA       User Key  (r) = Recorded By, (t) = Taken By, (c) = Cosigned By    Initials Name Provider Type    NADJA Potter, PT Physical Therapist                    Exercises       10/04/17 0900          Subjective Comments    Subjective Comments I fell early Monday morning when I got up to go to the bathroom, tripped runner rug and fell into table with left chest/shoulder area. Am feeling better today no medical attention was needed after the fall.  -NADJA      Subjective Pain    Able to rate subjective pain? yes  -NADJA      Pre-Treatment Pain Level 6  -NADJA      Post-Treatment Pain Level 3  -NADJA      Subjective Pain Comment Took my pain medicine this morning, so now is feeling good. The pain is in my loqwq back, buttocks, hips aqnd down the back of both legs.  -NADJA      Aquatics    Aquatics performed? Yes  -NADJA      Aquatics LE    Water Walk forward;side;backward   150 ft each  -NADJA      Stretch 1 Calf 20 sec X 2  -NADJA       Stretch 2 HS with noodle and sweeps x 15, large noodle  -NADJA      Stretch 3 piriformis 2x 30  -NADJA      Stretch Other 1 hip circles 20/20  -NADJA      Stretch Other 2 DKTC holding rail for decompression purposes 30 seconds prn  -NADJA      Abdominals Other (comment)   white foam dumbbells X 20  -NADJA      Clams Tuck Ups X12  -NADJA      Hip Abd/Add 15x  -NADJA      March in Place March Walk X 50 ft.  -NADJA      Mini Squat 20X  -NADJA      Toe/Heel Raises 15X  -NADJA      Uni-Squat Jumping jacks X 25  -ANDJA      Uni-Clock leg press blue ring x 20  -NADJA      Bicycle 2 minutes on bench and 2 min with LN  -NADJA      Flutter/Scissor 2 min each  -NADJA        User Key  (r) = Recorded By, (t) = Taken By, (c) = Cosigned By    Initials Name Provider Type    NADJA Potter, PT Physical Therapist                                       Time Calculation:   Start Time: 0905  Stop Time: 0946  Time Calculation (min): 41 min    Therapy Charges for Today     Code Description Service Date Service Provider Modifiers Qty    21693583749 HC PT AQUATIC THERAPY EA 15 MIN 10/4/2017 Tevin Potter, PT GP 3                    Tevin Potter, PT  10/4/2017

## 2017-10-09 ENCOUNTER — HOSPITAL ENCOUNTER (OUTPATIENT)
Dept: PHYSICAL THERAPY | Facility: HOSPITAL | Age: 52
Setting detail: THERAPIES SERIES
End: 2017-10-09

## 2017-10-11 ENCOUNTER — HOSPITAL ENCOUNTER (OUTPATIENT)
Dept: PHYSICAL THERAPY | Facility: HOSPITAL | Age: 52
Setting detail: THERAPIES SERIES
Discharge: HOME | End: 2017-10-11

## 2017-10-11 ENCOUNTER — APPOINTMENT (OUTPATIENT)
Dept: PHYSICAL THERAPY | Facility: HOSPITAL | Age: 52
End: 2017-10-11

## 2017-10-11 DIAGNOSIS — M54.41 CHRONIC BILATERAL LOW BACK PAIN WITH BILATERAL SCIATICA: Primary | ICD-10-CM

## 2017-10-11 DIAGNOSIS — G89.29 CHRONIC BILATERAL LOW BACK PAIN WITH BILATERAL SCIATICA: Primary | ICD-10-CM

## 2017-10-11 DIAGNOSIS — M54.42 CHRONIC BILATERAL LOW BACK PAIN WITH BILATERAL SCIATICA: Primary | ICD-10-CM

## 2017-10-11 PROCEDURE — 97113 AQUATIC THERAPY/EXERCISES: CPT

## 2017-10-12 ENCOUNTER — APPOINTMENT (OUTPATIENT)
Dept: PHYSICAL THERAPY | Facility: HOSPITAL | Age: 52
End: 2017-10-12

## 2017-10-23 ENCOUNTER — HOSPITAL ENCOUNTER (OUTPATIENT)
Dept: PHYSICAL THERAPY | Facility: HOSPITAL | Age: 52
Setting detail: THERAPIES SERIES
Discharge: HOME | End: 2017-10-23

## 2017-10-23 DIAGNOSIS — M54.42 CHRONIC BILATERAL LOW BACK PAIN WITH BILATERAL SCIATICA: Primary | ICD-10-CM

## 2017-10-23 DIAGNOSIS — M54.41 CHRONIC BILATERAL LOW BACK PAIN WITH BILATERAL SCIATICA: Primary | ICD-10-CM

## 2017-10-23 DIAGNOSIS — G89.29 CHRONIC BILATERAL LOW BACK PAIN WITH BILATERAL SCIATICA: Primary | ICD-10-CM

## 2017-10-23 PROCEDURE — G8979 MOBILITY GOAL STATUS: HCPCS | Performed by: PHYSICAL THERAPIST

## 2017-10-23 PROCEDURE — G8980 MOBILITY D/C STATUS: HCPCS | Performed by: PHYSICAL THERAPIST

## 2017-10-23 PROCEDURE — 97113 AQUATIC THERAPY/EXERCISES: CPT | Performed by: PHYSICAL THERAPIST

## 2017-10-23 NOTE — THERAPY DISCHARGE NOTE
Outpatient Physical Therapy Ortho Progress Note/Discharge Summary  [facility]     Patient Name: Jessenia López  : 1965  MRN: [id number]  Today's Date: 10/23/2017      Visit Date: 10/23/2017    Visit Dx:    ICD-10-CM ICD-9-CM   1. Chronic bilateral low back pain with bilateral sciatica M54.42 724.2    M54.41 724.3    G89.29 338.29       There is no problem list on file for this patient.       Past Medical History:   Diagnosis Date   • Arthritis    • Diabetes mellitus    • Hypertension    • Osteoporosis         Past Surgical History:   Procedure Laterality Date   • BRAIN SURGERY      brain tumor removed             PT Ortho       10/23/17 1000    Myotomal Screen- Lower Quarter Clearing    Hip flexion (L2) Bilateral:;4- (Good -)  -KH    Knee extension (L3) Bilateral:;4- (Good -)  -KH    Ankle DF (L4) Bilateral:;4- (Good -)  -KH    Ankle PF (S1) Bilateral:;4 (Good)  -KH    Knee flexion (S2) Bilateral:;4 (Good)  -KH      User Key  (r) = Recorded By, (t) = Taken By, (c) = Cosigned By    Initials Name Provider Type    ROCKY Rangel PT Physical Therapist                            PT Assessment/Plan       10/23/17 1058       PT Assessment    Functional Limitations Decreased safety during functional activities;Limitations in community activities;Impaired gait;Limitations in functional capacity and performance;Limitation in home management;Performance in self-care ADL;Performance in leisure activities  -     Impairments Pain;Muscle strength;Posture;Range of motion;Balance;Endurance;Gait;Joint integrity;Poor body mechanics;Joint mobility  -     Assessment Comments Patient has completed 10 sessions of skilled PT for LBP. In the last month she has only been able to complete a few sessions of therapy, however she demonstrates improved functional strength on the 5xSTS test. Her LE strength with MMT remains unchanged in the last month and her overall pain level is about the same, but she does report feeling  "some pain relief on the days she does aquatic therapy. Patient is able to complete her routine independently at this time and no longer requires skilled PT  -     Please refer to paper survey for additional self-reported information Yes  -KH     Rehab Potential Good  -KH     Patient/caregiver participated in establishment of treatment plan and goals Yes  -KH     Patient would benefit from skilled therapy intervention Yes  -KH     PT Plan    PT Frequency 2x/week  -KH     Planned CPT's? PT AQUATIC THERAPY EA 15 MIN: 27051  -     Physical Therapy Interventions (Optional Details) aquatics exercise  -     PT Plan Comments D/C skilled PT  -       User Key  (r) = Recorded By, (t) = Taken By, (c) = Cosigned By    Initials Name Provider Type    ROCKY Rangel, BLANCA Physical Therapist                    Exercises       10/23/17 1100          Subjective Comments    Subjective Comments I had pain this morning but I feel great right now  -      Subjective Pain    Able to rate subjective pain? yes  -KH      Pre-Treatment Pain Level 0  -KH      Post-Treatment Pain Level 0  -KH      Aquatics    Aquatics performed? Yes  -KH      Aquatics LE    Water Walk side;forward;backward  -KH      Stretch 1 Calf 20 sec X 2  -KH      Stretch 2 HS with noodle and sweeps x 15, large noodle  -KH      Stretch 3 piriformis 2x 30  -KH      Stretch Other 1 hip circles 15/15  -KH      Stretch Other 2 DKTC 3x10s  -KH      Vertical Traction Tiptoe/tandem x 3 laps  -KH      Abdominals noodle   large noodle x15  -KH      Clams Tuck Ups X12  -KH      Hip Abd/Add 15x  -KH      March in Place x3 laps  -KH      Mini Squat 20X  -KH      Uni-Clock leg press blue ring x 30  -KH      Step Ups 8\" x15 B  -KH      Bicycle 30 sec seated+ 2 min with rail   -KH      Flutter/Scissor 30 sec seated  -        User Key  (r) = Recorded By, (t) = Taken By, (c) = Cosigned By    Initials Name Provider Type    ROCKY Rangel PT Physical Therapist                 "               PT OP Goals       10/23/17 1100       PT Short Term Goals    STG Date to Achieve 11/22/17  -     STG 1 Patient will report a reduction in pain for 24 hours or greater following aquatic therapy session  -     STG 1 Progress Met  -     STG 2 Patient will improve 5xSTS from 22.2 seconds to <18 seconds in order to decrease risk of falls  -     STG 2 Progress Met  -     STG 3 Patient will demonstrate safety and independence with initial HEP  -     STG 3 Progress Met  -     Long Term Goals    LTG Date to Achieve 11/22/17  -     LTG 1 Patient will report increased standing tolerance from 5 minutes to 10 min or greater to allow patient to complete ADLs without an increase in pain  -     LTG 1 Progress Not Met  -     LTG 1 Progress Comments no change   -     LTG 2 Patient will improve perceived level of disability as measured by the Oswestry from 54% disability to </=40% disability in order to improve quality of life.   -     LTG 2 Progress Not Met  -     LTG 2 Progress Comments 68% disability.   -     LTG 3 Patient will be educated on and demonstrate safety and independence with advanced aquatic exercises for core stabilization  -     LTG 3 Progress Met  -     LTG 4 Pt will be independent with advanced aquatic program for decreased pain and improved mobility with ADLs  -     LTG 4 Progress Met  -       User Key  (r) = Recorded By, (t) = Taken By, (c) = Cosigned By    Initials Name Provider Type    ROCKY Rangel, PT Physical Therapist                    Outcome Measures       10/23/17 1000          5 Times Sit to Stand    5 Times Sit to Stand (seconds) 13.7 seconds  -      5 Times Sit to Stand Comments no hands  -      Modified Oswestry    Modified Oswestry Score/Comments 68% disability  -      Functional Assessment    Outcome Measure Options Modifed Owestry;5x Sit to Stand  -        User Key  (r) = Recorded By, (t) = Taken By, (c) = Cosigned By    Initials Name  Provider Type     Donna Ragnel PT Physical Therapist            Time Calculation:   Start Time: 1113  Stop Time: 1155  Time Calculation (min): 42 min    Therapy Charges for Today     Code Description Service Date Service Provider Modifiers Qty    63603435493 HC PT AQUATIC THERAPY EA 15 MIN 10/23/2017 Donna Rangel PT GP 3    64497035628 HC PT MOBILITY PROJECTED 10/23/2017 Donna Rangel PT GP, CK 1    [id number] HC PT MOBILITY DISCHARGE 10/23/2017 Donna Rangel PT GP, CL 1          PT G-Codes  PT Professional Judgement Used?: Yes  Outcome Measure Options: Chris Giles  Score: 68% disability  Functional Limitation: Mobility: Walking and moving around  Mobility: Walking and Moving Around Goal Status (): At least 40 percent but less than 60 percent impaired, limited or restricted  Mobility: Walking and Moving Around Discharge Status (): At least 60 percent but less than 80 percent impaired, limited or restricted     OP PT Discharge Summary  Date of Discharge: 10/23/17  Reason for Discharge: Independent  Outcomes Achieved: Patient able to partially acheive established goals  Discharge Destination: Home with home program      Donna Rangel PT  10/23/2017

## 2021-12-13 NOTE — THERAPY TREATMENT NOTE
Outpatient Physical Therapy Ortho Treatment Note  [facility]     Patient Name: Jessenia López  : 1965  MRN: [id number]  Today's Date: 10/11/2017      Visit Date: 10/11/2017    Visit Dx:    ICD-10-CM ICD-9-CM   1. Chronic bilateral low back pain with bilateral sciatica M54.42 724.2    M54.41 724.3    G89.29 338.29       There is no problem list on file for this patient.       Past Medical History:   Diagnosis Date   • Arthritis    • Diabetes mellitus    • Hypertension    • Osteoporosis         Past Surgical History:   Procedure Laterality Date   • BRAIN SURGERY      brain tumor removed                             PT Assessment/Plan       10/11/17 1141       PT Assessment    Assessment Comments (P)  Pt was very familiar with her exercises. Progressed multiple exercises with sets, reps, and resistance. Pt is nearly independent with HEP. Any remaining approved visits should be used to solidify independence with advanced aquatic program.   -NS     PT Plan    PT Plan Comments (P)  Continue to solidify independence with advanced aquatic program.  -NS       User Key  (r) = Recorded By, (t) = Taken By, (c) = Cosigned By    Initials Name Provider Type    HANS Brito, PT Student PT Student                    Exercises       10/11/17 1000          Subjective Comments    Subjective Comments (P)  Doing well, not teally as much pain today.  -NS      Subjective Pain    Able to rate subjective pain? (P)  yes  -NS      Pre-Treatment Pain Level (P)  4  -NS      Post-Treatment Pain Level (P)  4  -NS      Aquatics    Aquatics performed? (P)  Yes  -NS      Aquatics LE    Water Walk (P)  side;forward;backward  -NS      Stretch 1 (P)  Calf 20 sec X 2  -NS      Stretch 2 (P)  HS with noodle and sweeps x 15, large noodle  -NS      Stretch 3 (P)  piriformis 2x 30  -NS      Stretch Other 1 (P)  hip circles 15/15  -NS      Stretch Other 2 (P)  DKTC 3x10s  -NS      Vertical Traction (P)  Tiptoe/tandem x 3 laps  -NS       Patient changed into  scrubs. Belongings bagged and secured in room locker.    "Abdominals (P)  Other (comment)   Hydrotones x 20  -NS      Clams (P)  Tuck Ups X12  -NS      Hip Abd/Add (P)  15x  -NS      March in Place (P)  x3 laps  -NS      Mini Squat (P)  20X  -NS      Uni-Clock (P)  leg press blue ring x 30  -NS      Step Ups (P)  8\" x15 B  -NS      Bicycle (P)  30 sec seated  -NS      Flutter/Scissor (P)  30 sec seated  -NS        User Key  (r) = Recorded By, (t) = Taken By, (c) = Cosigned By    Initials Name Provider Type    HANS Brito, PT Student PT Student                                       Time Calculation:   Start Time: (P) 1100  Stop Time: (P) 1140  Time Calculation (min): (P) 40 min    Therapy Charges for Today     Code Description Service Date Service Provider Modifiers Qty    28878329071  PT AQUATIC THERAPY EA 15 MIN 10/11/2017 Enrico Brito PT Student GP 3                    Enrico Brito PT Student  10/11/2017       "